# Patient Record
Sex: MALE | Race: BLACK OR AFRICAN AMERICAN | Employment: FULL TIME | URBAN - METROPOLITAN AREA
[De-identification: names, ages, dates, MRNs, and addresses within clinical notes are randomized per-mention and may not be internally consistent; named-entity substitution may affect disease eponyms.]

---

## 2017-07-13 ENCOUNTER — HOSPITAL ENCOUNTER (EMERGENCY)
Age: 27
Discharge: HOME | End: 2017-07-13
Attending: EMERGENCY MEDICINE | Admitting: EMERGENCY MEDICINE
Payer: SELF-PAY

## 2017-07-13 ENCOUNTER — APPOINTMENT (OUTPATIENT)
Dept: GENERAL RADIOLOGY | Age: 27
End: 2017-07-13
Attending: PHYSICIAN ASSISTANT
Payer: SELF-PAY

## 2017-07-13 VITALS
HEART RATE: 74 BPM | DIASTOLIC BLOOD PRESSURE: 83 MMHG | OXYGEN SATURATION: 100 % | TEMPERATURE: 96.4 F | SYSTOLIC BLOOD PRESSURE: 158 MMHG | RESPIRATION RATE: 16 BRPM

## 2017-07-13 DIAGNOSIS — S39.012A LUMBAR STRAIN, INITIAL ENCOUNTER: Primary | ICD-10-CM

## 2017-07-13 PROCEDURE — 96372 THER/PROPH/DIAG INJ SC/IM: CPT

## 2017-07-13 PROCEDURE — 72100 X-RAY EXAM L-S SPINE 2/3 VWS: CPT

## 2017-07-13 PROCEDURE — 74011250636 HC RX REV CODE- 250/636: Performed by: PHYSICIAN ASSISTANT

## 2017-07-13 PROCEDURE — 99282 EMERGENCY DEPT VISIT SF MDM: CPT

## 2017-07-13 RX ORDER — NAPROXEN 500 MG/1
500 TABLET ORAL 2 TIMES DAILY WITH MEALS
Qty: 20 TAB | Refills: 0 | Status: SHIPPED | OUTPATIENT
Start: 2017-07-13 | End: 2017-07-23

## 2017-07-13 RX ORDER — METHOCARBAMOL 750 MG/1
750 TABLET, FILM COATED ORAL 4 TIMES DAILY
Qty: 20 TAB | Refills: 0 | Status: SHIPPED | OUTPATIENT
Start: 2017-07-13

## 2017-07-13 RX ORDER — HYDROCODONE BITARTRATE AND ACETAMINOPHEN 5; 325 MG/1; MG/1
1 TABLET ORAL
Qty: 8 TAB | Refills: 0 | Status: SHIPPED | OUTPATIENT
Start: 2017-07-13

## 2017-07-13 RX ORDER — KETOROLAC TROMETHAMINE 30 MG/ML
60 INJECTION, SOLUTION INTRAMUSCULAR; INTRAVENOUS
Status: COMPLETED | OUTPATIENT
Start: 2017-07-13 | End: 2017-07-13

## 2017-07-13 RX ADMIN — KETOROLAC TROMETHAMINE 60 MG: 30 INJECTION, SOLUTION INTRAMUSCULAR at 08:55

## 2017-07-13 NOTE — ED NOTES
Discharge discussed with the patient by ED provider, and all questioned fully answered. VSS. Pt ambulatory leaving ED.

## 2017-07-13 NOTE — ED PROVIDER NOTES
HPI Comments: Tami Barajas is a 28 y.o. male who presents ambulatory to ED [facility] ED with CC of sharp, 7/10 right lower back pain after twisting his back while playing basketball (7/9/17). Pt states his pain is exacerbated by laying flat. He states he has been ambulatory without difficulty since onset of his pain. Pt denies hx of similar symptoms. He states he has been taking Tylenol without significant improvement in his symptoms. Pt notes he is on his feet frequently while at work. He specifically denies constipation, difficulty urinating, bowel or bladder incontinence, and numbness of any nature. Pt states he will be driving himself home. He states he smokes ~1.5 ppd. PCP: None    There are no other complaints, changes, or physical findings at this time. The history is provided by the patient. No past medical history on file. No past surgical history on file. No family history on file. Social History     Social History    Marital status: SINGLE     Spouse name: N/A    Number of children: N/A    Years of education: N/A     Occupational History    Not on file. Social History Main Topics    Smoking status: Not on file    Smokeless tobacco: Not on file    Alcohol use Not on file    Drug use: Not on file    Sexual activity: Not on file     Other Topics Concern    Not on file     Social History Narrative         ALLERGIES: Review of patient's allergies indicates no known allergies. Review of Systems   Constitutional: Negative. Negative for activity change, appetite change, chills, diaphoresis, fever and unexpected weight change. HENT: Negative for congestion, hearing loss, rhinorrhea, sinus pressure, sneezing, sore throat and trouble swallowing. Eyes: Negative for pain, redness, itching and visual disturbance. Respiratory: Negative for cough, shortness of breath and wheezing. Cardiovascular: Negative for chest pain, palpitations and leg swelling.    Gastrointestinal: Negative for abdominal pain, constipation, diarrhea, nausea and vomiting.        -incontinence   Genitourinary: Negative for difficulty urinating and dysuria. -incontinence   Musculoskeletal: Positive for back pain (right lower). Negative for arthralgias, gait problem and myalgias. Skin: Negative for color change, pallor, rash and wound. Neurological: Negative for tremors, weakness, light-headedness, numbness and headaches. All other systems reviewed and are negative. Patient Vitals for the past 12 hrs:   Temp Pulse Resp BP SpO2   07/13/17 0835 96.4 °F (35.8 °C) 74 16 158/83 100 %            Physical Exam   Constitutional: He is oriented to person, place, and time. Vital signs are normal. He appears well-developed and well-nourished. No distress. 28 y.o.  male in NAD  Communicates appropriately and in full sentences   HENT:   Head: Normocephalic and atraumatic. Right Ear: External ear normal.   Left Ear: External ear normal.   Mouth/Throat: Oropharynx is clear and moist.   Eyes: Conjunctivae are normal. Pupils are equal, round, and reactive to light. Right eye exhibits no discharge. Left eye exhibits no discharge. Neck: Normal range of motion. Neck supple. No tracheal deviation present. No nuchal rigidity or meningeal signs  No c-spine tenderness  Full APROM of c-spine   Cardiovascular: Intact distal pulses. Pulmonary/Chest: No stridor. Musculoskeletal: Normal range of motion. He exhibits no edema, tenderness or deformity. No neurologic, motor, vascular, or compartment embarrassment observed on exam. No focal neurologic deficits; right lumbar muscular tenderness; no obvious spasm, no focal spinal tenderness, no step-offs or deformity; positive right straight leg raise   Lymphadenopathy:     He has no cervical adenopathy. Neurological: He is alert and oriented to person, place, and time. No cranial nerve deficit. Coordination normal.   No focal neuro deficits. NVI.  Neurologically intact of UE and LE B/L  Sensation intact and symmetrical of UE and LE B/L. Strength 5/5 of UE B/L, Strength 5/5 of LE B/L. Symmetric bulk and tone of LE muscle groups. Skin: Skin is warm and dry. No rash noted. He is not diaphoretic. No erythema. No pallor. No acute overlying skin changes   Psychiatric: He has a normal mood and affect. Nursing note and vitals reviewed. MDM  Number of Diagnoses or Management Options  Lumbar strain, initial encounter:   Diagnosis management comments: DDx: lumbar strain, lumbar sprain, HNP, DDD, DJD, spondylolysis, spondylolisthesis, muscle spasm, compression fracture    Per the patient, the CORNELIA was mechanical in nature. Exam reassuring and pt without red-flag S/Sx. Do not suspect infectious, neoplastic, abdominal, , cardiopulmonary or progressive neurological etiology of back pain. Reviewed negative imaging with patient. Provided with analgesia and referred to orthopedics. Amount and/or Complexity of Data Reviewed  Tests in the radiology section of CPT®: ordered and reviewed  Review and summarize past medical records: yes  Independent visualization of images, tracings, or specimens: yes      ED Course       Procedures    I reviewed our electronic medical record system for any past medical records that were available that may contribute to the patients current condition, the nursing notes and vital signs from today's visit     Nursing notes will be reviewed as they become available in realtime while the pt is in the ED. Discussed the risks of smoking and the benefits of smoking cessation as well as the long term sequelae of smoking with the pt. The patient verbalized their understanding. Progress Note:  8:46 AM  The patients presenting problems have been discussed, and they are in agreement with the care plan formulated and outlined with them. I have encouraged them to ask questions as they arise throughout their visit.  Will continue to monitor. Progress Note:  9:31 AM  Provider re-evaluated pt. Per patient, pain has improved. Provider discussed all available diagnostics, diagnosis, and treatment plan. Thoroughly discussed worrisome signs/symptoms in which pt should immediately return to ED, otherwise, urged to follow-up with orthopedics. Patient conveys understanding and agreement to all of the above. All patient's questions were answered by provider. DISCHARGE NOTE:  9:37 AM  Gaston Norman's  results have been reviewed with him. He has been counseled regarding his diagnosis. He verbally conveys understanding and agreement of the signs, symptoms, diagnosis, treatment and prognosis and additionally agrees to follow up as recommended with Dr. None in 24 - 48 hours. He also agrees with the care-plan and conveys that all of his questions have been answered. I have also put together some discharge instructions for him that include: 1) educational information regarding their diagnosis, 2) how to care for their diagnosis at home, as well a 3) list of reasons why they would want to return to the ED prior to their follow-up appointment, should their condition change. He and/or family's questions have been answered. I have encouraged them to see the official results in [facility]\" or to retrieve the specifics of their results from medical records. IMAGING COMPLETED AND REVIEWED:  XR SPINE LUMB 2 OR 3 V   Final Result   Indication: Lower back pain, hurt back playing basketball 4 days ago     Three views of the lumbar spine demonstrate normal alignment without evidence of  acute fracture or subluxation.     IMPRESSION  Impression: No acute process. CLINICAL IMPRESSION:  1. Lumbar strain, initial encounter        Plan:  1. Return precautions  2. Medications as prescribed  3.  Follow-ups as discussed  Discharge Medication List as of 7/13/2017  9:34 AM      START taking these medications    Details   methocarbamol (ROBAXIN-750) 750 mg tablet Take 1 Tab by mouth four (4) times daily. , Print, Disp-20 Tab, R-0      naproxen (NAPROSYN) 500 mg tablet Take 1 Tab by mouth two (2) times daily (with meals) for 10 days. , Print, Disp-20 Tab, R-0      HYDROcodone-acetaminophen (NORCO) 5-325 mg per tablet Take 1 Tab by mouth every four (4) hours as needed for Pain. Max Daily Amount: 6 Tabs., Print, Disp-8 Tab, R-0           Follow-up Information     Follow up With Details Comments Contact Info    None Schedule an appointment as soon as possible for a visit in 2 days As needed, If symptoms worsen, Possible further evaluation and treatment None ([phone]) Patient stated that they have no PCP      [facility] EMERG[facility]Y DEPT Go to As needed, If symptoms worsen [address] [facility] [address] Rosa Mccoy MD Schedule an appointment as soon as possible for a visit in 2 days As needed, If symptoms worsen, Possible further evaluation and treatment [address]  [address] [place] [address]  [facility]  [phone]          Return to the closest emergency room or follow up sooner for any deterioration    This note is prepared by Jhon Mayfield, acting as Scribe for SkimlinksYANNI. SkimlinksYANNI: The scribe's documentation has been prepared under my direction and personally reviewed by me in its entirety. I confirm that the note above accurately reflects all work, treatment, procedures, and medical decision making performed by me. This note will not be viewable in [address]

## 2017-07-13 NOTE — LETTER
[address] 
[facility] EMERGENCY DEPT 
[address] [address] 
[phone] Work/School Note Date: 7/13/2017 To Whom It May concern: 
 
Marian Bruno was seen and treated today in the emergency room by the following provider(s): 
Attending Provider: Cookie Johnson MD 
Physician Assistant: Dakota Paul, Shannon Chavez. Marian Bruno may return to work on 07/15/2017. Sincerely, 
 
 
 
 
Dakota Paul, Shannon Chavez

## 2017-07-13 NOTE — DISCHARGE INSTRUCTIONS
Back Strain: Care Instructions  Your Care Instructions    Back strain happens when you overstretch, or pull, a muscle in your back. You may hurt your back in an accident or when you exercise or lift something. Most back pain will get better with rest and time. You can take care of yourself at home to help your back heal.  Follow-up care is a key part of your treatment and safety. Be sure to make and go to all appointments, and call your doctor if you are having problems. It's also a good idea to know your test results and keep a list of the medicines you take. How can you care for yourself at home? · Try to stay as active as you can, but stop or reduce any activity that causes pain. · Put ice or a cold pack on the sore muscle for 10 to 20 minutes at a time to stop swelling. Try this every 1 to 2 hours for 3 days (when you are awake) or until the swelling goes down. Put a thin cloth between the ice pack and your skin. · After 2 or 3 days, apply a heating pad on low or a warm cloth to your back. Some doctors suggest that you go back and forth between hot and cold treatments. · Take pain medicines exactly as directed. ¨ If the doctor gave you a prescription medicine for pain, take it as prescribed. ¨ If you are not taking a prescription pain medicine, ask your doctor if you can take an over-the-counter medicine. · Try sleeping on your side with a pillow between your legs. Or put a pillow under your knees when you lie on your back. These measures can ease pain in your lower back. · Return to your usual level of activity slowly. When should you call for help? Call 911 anytime you think you may need emergency care. For example, call if:  · You are unable to move a leg at all. Call your doctor now or seek immediate medical care if:  · You have new or worse symptoms in your legs, belly, or buttocks. Symptoms may include:  ¨ Numbness or tingling. ¨ Weakness. ¨ Pain.   · You lose bladder or bowel control. Watch closely for changes in your health, and be sure to contact your doctor if you are not getting better as expected. Where can you learn more? Go to http://tosin-katlin.info/. Enter B447 in the search box to learn more about \"Back Strain: Care Instructions. \"  Current as of: March 21, 2017  Content Version: 11.3  © 6291-2987 [employer]. Care instructions adapted under license by [employer] (which disclaims liability or warranty for this information). If you have questions about a medical condition or this instruction, always ask your healthcare professional. [place] [address] any warranty or liability for your use of this information. Learning About Relief for Back Pain  What is back tension and strain? Back strain happens when you overstretch, or pull, a muscle in your back. You may hurt your back in an accident or when you exercise or lift something. Most back pain will get better with rest and time. You can take care of yourself at home to help your back heal.  What can you do first to relieve back pain? When you first feel back pain, try these steps:  · Walk. Take a short walk (10 to 20 minutes) on a level surface (no slopes, hills, or stairs) every 2 to 3 hours. Walk only distances you can manage without pain, especially leg pain. · Relax. Find a comfortable position for rest. Some people are comfortable on the floor or a medium-firm bed with a small pillow under their head and another under their knees. Some people prefer to lie on their side with a pillow between their knees. Don't stay in one position for too long. · Try heat or ice. Try using a heating pad on a low or medium setting, or take a warm shower, for 15 to 20 minutes every 2 to 3 hours. Or you can buy single-use heat wraps that last up to 8 hours. You can also try an ice pack for 10 to 15 minutes every 2 to 3 hours.  You can use an ice pack or a bag of frozen vegetables wrapped in a thin towel. There is not strong evidence that either heat or ice will help, but you can try them to see if they help. You may also want to try switching between heat and cold. · Take pain medicine exactly as directed. ¨ If the doctor gave you a prescription medicine for pain, take it as prescribed. ¨ If you are not taking a prescription pain medicine, ask your doctor if you can take an over-the-counter medicine. What else can you do? · Stretch and exercise. Exercises that increase flexibility may relieve your pain and make it easier for your muscles to keep your spine in a good, neutral position. And don't forget to keep walking. · Do self-massage. You can use self-massage to unwind after work or school or to energize yourself in the morning. You can easily massage your feet, hands, or neck. Self-massage works best if you are in comfortable clothes and are sitting or lying in a comfortable position. Use oil or lotion to massage bare skin. · Reduce stress. Back pain can lead to a vicious [place]: Distress about the pain tenses the muscles in your back, which in turn causes more pain. Learn how to relax your mind and your muscles to lower your stress. Where can you learn more? Go to http://tosin-katlin.info/. Enter L205 in the search box to learn more about \"Learning About Relief for Back Pain. \"  Current as of: March 21, 2017  Content Version: 11.3  © 3572-0832 [employer]. Care instructions adapted under license by [employer] (which disclaims liability or warranty for this information). If you have questions about a medical condition or this instruction, always ask your healthcare professional. [place] [address] any warranty or liability for your use of this information. Back Pain: Care Instructions  Your Care Instructions    Back pain has many possible causes.  It is often related to problems with muscles and ligaments of the back. It may also be related to problems with the nerves, discs, or bones of the back. Moving, lifting, standing, sitting, or sleeping in an awkward way can strain the back. Sometimes you don't notice the injury until later. Arthritis is another common cause of back pain. Although it may hurt a lot, back pain usually improves on its own within several weeks. Most people recover in 12 weeks or less. Using good home treatment and being careful not to stress your back can help you feel better sooner. Follow-up care is a key part of your treatment and safety. Be sure to make and go to all appointments, and call your doctor if you are having problems. Its also a good idea to know your test results and keep a list of the medicines you take. How can you care for yourself at home? · Sit or lie in positions that are most comfortable and reduce your pain. Try one of these positions when you lie down:  ¨ Lie on your back with your knees bent and supported by large pillows. ¨ Lie on the floor with your legs on the seat of a sofa or chair. [place] on your side with your knees and hips bent and a pillow between your legs. ¨ Lie on your stomach if it does not make pain worse. · Do not sit up in bed, and avoid soft couches and twisted positions. Bed rest can help relieve pain at first, but it delays healing. Avoid bed rest after the first day of back pain. · Change positions every 30 minutes. If you must sit for long periods of time, take breaks from sitting. Get up and walk around, or lie in a comfortable position. · Try using a heating pad on a low or medium setting for 15 to 20 minutes every 2 or 3 hours. Try a warm shower in place of one session with the heating pad. · You can also try an ice pack for 10 to 15 minutes every 2 to 3 hours. Put a thin cloth between the ice pack and your skin. · Take pain medicines exactly as directed.   ¨ If the doctor gave you a prescription medicine for pain, take it as prescribed. ¨ If you are not taking a prescription pain medicine, ask your doctor if you can take an over-the-counter medicine. · Take short walks several times a day. You can start with 5 to 10 minutes, 3 or 4 times a day, and work up to longer walks. Walk on level surfaces and avoid hills and stairs until your back is better. · Return to work and other activities as soon as you can. Continued rest without activity is usually not good for your back. · To prevent future back pain, do exercises to stretch and strengthen your back and stomach. Learn how to use good posture, safe lifting techniques, and proper body mechanics. When should you call for help? Call your doctor now or seek immediate medical care if:  · You have new or worsening numbness in your legs. · You have new or worsening weakness in your legs. (This could make it hard to stand up.)  · You lose control of your bladder or bowels. Watch closely for changes in your health, and be sure to contact your doctor if:  · Your pain gets worse. · You are not getting better after 2 weeks. Where can you learn more? Go to http://tosin-katlin.info/. Enter E535 in the search box to learn more about \"Back Pain: Care Instructions. \"  Current as of: March 21, 2017  Content Version: 11.3  © 9186-7713 [employer]. Care instructions adapted under license by [employer] (which disclaims liability or warranty for this information). If you have questions about a medical condition or this instruction, always ask your healthcare professional. [place] [address] any warranty or liability for your use of this information.